# Patient Record
Sex: MALE | Race: WHITE | Employment: UNEMPLOYED | URBAN - METROPOLITAN AREA
[De-identification: names, ages, dates, MRNs, and addresses within clinical notes are randomized per-mention and may not be internally consistent; named-entity substitution may affect disease eponyms.]

---

## 2017-11-20 PROBLEM — G89.29 CHRONIC WRIST PAIN: Status: ACTIVE | Noted: 2017-11-20

## 2017-11-20 PROBLEM — Z72.0 TOBACCO ABUSE: Status: ACTIVE | Noted: 2017-11-20

## 2017-11-20 PROBLEM — Z88.9 H/O MULTIPLE ALLERGIES: Status: ACTIVE | Noted: 2017-11-20

## 2017-11-20 PROBLEM — M79.671 RIGHT FOOT PAIN: Status: ACTIVE | Noted: 2017-11-20

## 2017-11-20 PROBLEM — M25.539 CHRONIC WRIST PAIN: Status: ACTIVE | Noted: 2017-11-20

## 2018-07-12 ENCOUNTER — OFFICE VISIT (OUTPATIENT)
Dept: FAMILY MEDICINE CLINIC | Age: 43
End: 2018-07-12
Payer: MEDICARE

## 2018-07-12 ENCOUNTER — HOSPITAL ENCOUNTER (OUTPATIENT)
Age: 43
Discharge: HOME | End: 2018-07-12
Payer: MEDICARE

## 2018-07-12 VITALS
BODY MASS INDEX: 22.12 KG/M2 | HEART RATE: 76 BPM | HEIGHT: 71 IN | SYSTOLIC BLOOD PRESSURE: 112 MMHG | WEIGHT: 158 LBS | DIASTOLIC BLOOD PRESSURE: 74 MMHG | OXYGEN SATURATION: 98 % | RESPIRATION RATE: 18 BRPM

## 2018-07-12 DIAGNOSIS — Z77.29 CARBON MONOXIDE EXPOSURE: Primary | ICD-10-CM

## 2018-07-12 DIAGNOSIS — Z77.29 CARBON MONOXIDE EXPOSURE: ICD-10-CM

## 2018-07-12 DIAGNOSIS — F41.9 ANXIETY: ICD-10-CM

## 2018-07-12 DIAGNOSIS — G89.4 CHRONIC PAIN SYNDROME: ICD-10-CM

## 2018-07-12 LAB
COHB: 3.2 % (ref 0–1.5)
CRITICAL: ABNORMAL
DATE ANALYZED: ABNORMAL
DATE OF COLLECTION: ABNORMAL
HHB: 0.3 % (ref 0–5)
LAB: ABNORMAL
Lab: 1722
METHB: 0.2 % (ref 0–1.5)
MODE: ABNORMAL
O2 SATURATION: 99.7 % (ref 92–98.5)
O2HB: 96.3 % (ref 94–97)
OPERATOR ID: 9097
PATIENT TEMP: 37 C
SOURCE, BLOOD GAS: ABNORMAL
THB: 15 G/DL (ref 11.5–16.5)
TIME ANALYZED: 1737

## 2018-07-12 PROCEDURE — 1036F TOBACCO NON-USER: CPT | Performed by: FAMILY MEDICINE

## 2018-07-12 PROCEDURE — G8420 CALC BMI NORM PARAMETERS: HCPCS | Performed by: FAMILY MEDICINE

## 2018-07-12 PROCEDURE — 82375 ASSAY CARBOXYHB QUANT: CPT

## 2018-07-12 PROCEDURE — 99214 OFFICE O/P EST MOD 30 MIN: CPT | Performed by: FAMILY MEDICINE

## 2018-07-12 PROCEDURE — G8427 DOCREV CUR MEDS BY ELIG CLIN: HCPCS | Performed by: FAMILY MEDICINE

## 2018-07-12 RX ORDER — ZINC GLUCONATE 50 MG
TABLET ORAL
COMMUNITY
End: 2019-05-13 | Stop reason: CLARIF

## 2018-07-12 RX ORDER — MAGNESIUM GLUCONATE 27 MG(500)
500 TABLET ORAL DAILY
COMMUNITY
End: 2019-05-13 | Stop reason: CLARIF

## 2018-07-12 RX ORDER — CHOLECALCIFEROL (VITAMIN D3) 25 MCG
TABLET ORAL
COMMUNITY
End: 2019-05-13 | Stop reason: CLARIF

## 2018-07-12 ASSESSMENT — ENCOUNTER SYMPTOMS
EYE ITCHING: 0
CONSTIPATION: 0
SHORTNESS OF BREATH: 0
ABDOMINAL PAIN: 1
ANAL BLEEDING: 0
COLOR CHANGE: 0
EYE PAIN: 0
WHEEZING: 0
COUGH: 0
VOMITING: 0
BACK PAIN: 1
NAUSEA: 1
DIARRHEA: 0
SORE THROAT: 0
BLOOD IN STOOL: 0
RHINORRHEA: 0

## 2018-07-12 ASSESSMENT — PATIENT HEALTH QUESTIONNAIRE - PHQ9
SUM OF ALL RESPONSES TO PHQ9 QUESTIONS 1 & 2: 0
1. LITTLE INTEREST OR PLEASURE IN DOING THINGS: 0
2. FEELING DOWN, DEPRESSED OR HOPELESS: 0
SUM OF ALL RESPONSES TO PHQ QUESTIONS 1-9: 0

## 2018-07-12 NOTE — PATIENT INSTRUCTIONS
It was nice to meet you   Please let me know if you have any questions or concerns for me   Follow up in 3 months.
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
## 2018-07-20 ENCOUNTER — TELEPHONE (OUTPATIENT)
Dept: FAMILY MEDICINE CLINIC | Age: 43
End: 2018-07-20

## 2018-07-20 DIAGNOSIS — T58.91XA CARBOXYHEMOGLOBINEMIA, ACCIDENTAL OR UNINTENTIONAL, INITIAL ENCOUNTER: Primary | ICD-10-CM

## 2018-08-10 ENCOUNTER — TELEPHONE (OUTPATIENT)
Dept: FAMILY MEDICINE CLINIC | Age: 43
End: 2018-08-10

## 2018-08-13 ENCOUNTER — TELEPHONE (OUTPATIENT)
Dept: FAMILY MEDICINE CLINIC | Age: 43
End: 2018-08-13

## 2019-05-13 ENCOUNTER — OFFICE VISIT (OUTPATIENT)
Dept: FAMILY MEDICINE CLINIC | Age: 44
End: 2019-05-13
Payer: MEDICARE

## 2019-05-13 VITALS
RESPIRATION RATE: 18 BRPM | DIASTOLIC BLOOD PRESSURE: 64 MMHG | OXYGEN SATURATION: 98 % | WEIGHT: 153 LBS | BODY MASS INDEX: 21.34 KG/M2 | SYSTOLIC BLOOD PRESSURE: 132 MMHG | HEART RATE: 83 BPM

## 2019-05-13 DIAGNOSIS — M25.512 CHRONIC PAIN OF BOTH SHOULDERS: ICD-10-CM

## 2019-05-13 DIAGNOSIS — G89.29 CHRONIC PAIN OF BOTH SHOULDERS: ICD-10-CM

## 2019-05-13 DIAGNOSIS — F22 DELUSIONAL DISORDER (HCC): ICD-10-CM

## 2019-05-13 DIAGNOSIS — M54.2 NECK PAIN: Primary | ICD-10-CM

## 2019-05-13 DIAGNOSIS — M25.511 CHRONIC PAIN OF BOTH SHOULDERS: ICD-10-CM

## 2019-05-13 PROBLEM — Z00.00 PREVENTATIVE HEALTH CARE: Status: ACTIVE | Noted: 2019-05-13

## 2019-05-13 PROCEDURE — 99213 OFFICE O/P EST LOW 20 MIN: CPT | Performed by: FAMILY MEDICINE

## 2019-05-13 PROCEDURE — G8427 DOCREV CUR MEDS BY ELIG CLIN: HCPCS | Performed by: FAMILY MEDICINE

## 2019-05-13 PROCEDURE — G8420 CALC BMI NORM PARAMETERS: HCPCS | Performed by: FAMILY MEDICINE

## 2019-05-13 PROCEDURE — 1036F TOBACCO NON-USER: CPT | Performed by: FAMILY MEDICINE

## 2019-05-13 RX ORDER — CLONAZEPAM 1 MG/1
TABLET ORAL
COMMUNITY
Start: 2019-04-11 | End: 2019-05-13 | Stop reason: CLARIF

## 2019-05-13 ASSESSMENT — ENCOUNTER SYMPTOMS
SHORTNESS OF BREATH: 0
CONSTIPATION: 0
ABDOMINAL PAIN: 0
NAUSEA: 1
WHEEZING: 0
DIARRHEA: 0
COUGH: 0
VOMITING: 0

## 2019-05-13 ASSESSMENT — PATIENT HEALTH QUESTIONNAIRE - PHQ9
SUM OF ALL RESPONSES TO PHQ QUESTIONS 1-9: 1
1. LITTLE INTEREST OR PLEASURE IN DOING THINGS: 0
SUM OF ALL RESPONSES TO PHQ9 QUESTIONS 1 & 2: 1
SUM OF ALL RESPONSES TO PHQ QUESTIONS 1-9: 1
2. FEELING DOWN, DEPRESSED OR HOPELESS: 1

## 2019-05-13 NOTE — PROGRESS NOTES
[address] [facility] [place]  [phone]   Oneil Macdonald MD     Patient: Oksana Sanderson  YOB: 1975  Visit Date: 5/13/19    Maria Eugenia Beyer is a 37y.o. year old male here today for   Chief Complaint   Patient presents with    Check-Up     needs lab for carbon monoxide     Other     wants drug and alcohol screening having some back and rib pain        HPI  Patient is a 37year old year old male here for check up. [facility] for chronic pain , PTSD. [facility] - is weaning down this medications. Now only takes 2mg over the whole day. Overall has been feeling ok. Had been off of medications for a month or so and this was bothersome to him. Does not want blood work if he doesn't need it. Does not want Tdap today. Patient has concerns of knots all over his body that cause him issues. Feels like if he stands up too fast the \"knots\" in his neck \"choke me out\"     Review of Systems   Constitutional: Negative for chills and fever. Eyes: Positive for visual disturbance (wears glasses). Respiratory: Negative for cough, shortness of breath and wheezing. Cardiovascular: Positive for leg swelling (occasional). Negative for chest pain and palpitations. Gastrointestinal: Positive for nausea (intermittent). Negative for abdominal pain, constipation, diarrhea and vomiting. Genitourinary: Negative for dysuria, frequency and hematuria. Neurological: Positive for light-headedness. Negative for dizziness. Psychiatric/Behavioral: Positive for sleep disturbance (gets 5 hours ). Current Outpatient Medications on File Prior to Visit   Medication Sig Dispense Refill    clonazePAM (KLONOPIN) 2 MG tablet Take 2 mg by mouth 3 times daily as needed for Anxiety. Patient has been tapering down this medication . No current facility-administered medications on file prior to visit. Allergies   Allergen Reactions    Keflex [Cephalexin] Anaphylaxis    Pcn [Penicillins] Anaphylaxis    Shellfish-Derived Products Anaphylaxis     Lobster     Polymox [Amoxicillin] Other (See Comments)     Not sure of effect       Past medical, surgical, socialand/or family history reviewed, updated as needed, and are non-contributory (unless otherwise stated). Medications, allergies, and problem list also reviewed and updated as needed in patient's record. Wt Readings from Last 3 Encounters:   05/13/19 153 lb (69.4 kg)   07/12/18 158 lb (71.7 kg)   11/20/17 178 lb (80.7 kg)                   /64 (Site: Right Upper Arm, Position: Sitting, Cuff Size: Medium Adult)   Pulse 83   Resp 18   Wt 153 lb (69.4 kg)   SpO2 98%   BMI 21.34 kg/m²        Physical Exam   Constitutional: He appears well-developed and well-nourished. HENT:   Head: Normocephalic and atraumatic. Eyes: Pupils are equal, round, and reactive to light. EOM are normal.   Neck: Normal range of motion. Neck supple. Cardiovascular: Normal rate, regular rhythm, normal heart sounds and intact distal pulses. Exam reveals no friction rub. No murmur heard. Pulmonary/Chest: Effort normal and breath sounds normal. No stridor. No respiratory distress. He has no wheezes. He has no rales. Abdominal: Soft. Bowel sounds are normal. He exhibits no distension and no mass. There is no tenderness. There is no rebound and no guarding. Musculoskeletal: Normal range of motion. He exhibits no edema. Skin: Skin is warm and dry. Nursing note and vitals reviewed. Patient has multiple marking on his body where he has mapped out the \"knots\" . Patient does not seen to have any     ASSESSMENT/PLAN  Raegan Pham was seen today for check-up and other.     Diagnoses and all orders for this visit:      Neck pain  -     [facility]    Chronic pain of both shoulders  -     [facility] - Physical Therapy, [facility]

## 2019-05-13 NOTE — PATIENT INSTRUCTIONS
It was nice to see you   Physical therapy will call you to schedule   We will get blood work at your next visit   Follow up in 6 months.

## 2019-05-15 PROBLEM — M25.512 CHRONIC PAIN OF BOTH SHOULDERS: Status: ACTIVE | Noted: 2019-05-15

## 2019-05-15 PROBLEM — F22 DELUSIONAL DISORDER (HCC): Status: ACTIVE | Noted: 2019-05-15

## 2019-05-15 PROBLEM — M25.511 CHRONIC PAIN OF BOTH SHOULDERS: Status: ACTIVE | Noted: 2019-05-15

## 2019-05-15 PROBLEM — G89.29 CHRONIC PAIN OF BOTH SHOULDERS: Status: ACTIVE | Noted: 2019-05-15

## 2019-05-15 PROBLEM — M54.2 NECK PAIN: Status: ACTIVE | Noted: 2019-05-15

## 2019-06-12 PROBLEM — Z00.00 PREVENTATIVE HEALTH CARE: Status: RESOLVED | Noted: 2019-05-13 | Resolved: 2019-06-12

## 2020-03-24 ENCOUNTER — TELEPHONE (OUTPATIENT)
Dept: ADMINISTRATIVE | Age: 45
End: 2020-03-24
